# Patient Record
Sex: MALE | URBAN - METROPOLITAN AREA
[De-identification: names, ages, dates, MRNs, and addresses within clinical notes are randomized per-mention and may not be internally consistent; named-entity substitution may affect disease eponyms.]

---

## 2021-10-02 ENCOUNTER — HOSPITAL ENCOUNTER (EMERGENCY)
Facility: HOSPITAL | Age: 31
Discharge: LEFT BEFORE DISCHARGE | End: 2021-10-02
Payer: MEDICAID

## 2021-10-02 VITALS
OXYGEN SATURATION: 97 % | SYSTOLIC BLOOD PRESSURE: 115 MMHG | HEART RATE: 89 BPM | TEMPERATURE: 99 F | RESPIRATION RATE: 22 BRPM | DIASTOLIC BLOOD PRESSURE: 86 MMHG

## 2021-10-02 RX ORDER — ALBUTEROL SULFATE 90 UG/1
2 AEROSOL, METERED RESPIRATORY (INHALATION) 4 TIMES DAILY
Status: DISCONTINUED | OUTPATIENT
Start: 2021-10-02 | End: 2021-10-02

## 2021-10-02 NOTE — ED INITIAL ASSESSMENT (HPI)
Pt c/o asthma exacerbation since last night. Pt has expiratory wheezes. Pt was offered a breathing treatment but informed he needed a rapid covid test first. Pt is not vaccinated.    Pt adamantly refused a covid test despite this RN attempting to thorou

## 2021-10-02 NOTE — ED QUICK NOTES
Pt c/o asthma attack, states he ran out of inhaler and that is all he needs he states he does not have covid and will not get swab for covid only need an inhaler. Patient work of breathing is easy and unlabored.  Skin colored is appropriate and speaking in